# Patient Record
Sex: MALE | Race: WHITE | NOT HISPANIC OR LATINO | URBAN - METROPOLITAN AREA
[De-identification: names, ages, dates, MRNs, and addresses within clinical notes are randomized per-mention and may not be internally consistent; named-entity substitution may affect disease eponyms.]

---

## 2019-07-25 ENCOUNTER — RECORDS - HEALTHEAST (OUTPATIENT)
Dept: LAB | Facility: CLINIC | Age: 84
End: 2019-07-25

## 2019-07-25 LAB
ANION GAP SERPL CALCULATED.3IONS-SCNC: 8 MMOL/L (ref 5–18)
BUN SERPL-MCNC: 16 MG/DL (ref 8–28)
CALCIUM SERPL-MCNC: 9.5 MG/DL (ref 8.5–10.5)
CHLORIDE BLD-SCNC: 107 MMOL/L (ref 98–107)
CO2 SERPL-SCNC: 27 MMOL/L (ref 22–31)
CREAT SERPL-MCNC: 0.96 MG/DL (ref 0.7–1.3)
ERYTHROCYTE [DISTWIDTH] IN BLOOD BY AUTOMATED COUNT: 13.1 % (ref 11–14.5)
GFR SERPL CREATININE-BSD FRML MDRD: >60 ML/MIN/1.73M2
GLUCOSE BLD-MCNC: 115 MG/DL (ref 70–125)
HCT VFR BLD AUTO: 44.8 % (ref 40–54)
HGB BLD-MCNC: 14.1 G/DL (ref 14–18)
MCH RBC QN AUTO: 30.8 PG (ref 27–34)
MCHC RBC AUTO-ENTMCNC: 31.5 G/DL (ref 32–36)
MCV RBC AUTO: 98 FL (ref 80–100)
PLATELET # BLD AUTO: 165 THOU/UL (ref 140–440)
PMV BLD AUTO: 11.8 FL (ref 8.5–12.5)
POTASSIUM BLD-SCNC: 4.1 MMOL/L (ref 3.5–5)
RBC # BLD AUTO: 4.58 MILL/UL (ref 4.4–6.2)
SODIUM SERPL-SCNC: 142 MMOL/L (ref 136–145)
TSH SERPL DL<=0.005 MIU/L-ACNC: 0.94 UIU/ML (ref 0.3–5)
VIT B12 SERPL-MCNC: 290 PG/ML (ref 213–816)
WBC: 6.3 THOU/UL (ref 4–11)

## 2019-10-25 ENCOUNTER — COMMUNICATION - HEALTHEAST (OUTPATIENT)
Dept: NEUROSURGERY | Facility: CLINIC | Age: 84
End: 2019-10-25

## 2019-10-25 DIAGNOSIS — S06.5XAA SDH (SUBDURAL HEMATOMA) (H): ICD-10-CM

## 2019-11-08 ENCOUNTER — HOSPITAL ENCOUNTER (OUTPATIENT)
Dept: CT IMAGING | Facility: HOSPITAL | Age: 84
Discharge: HOME | End: 2019-11-08
Attending: SURGERY

## 2019-11-08 DIAGNOSIS — S06.5XAA SDH (SUBDURAL HEMATOMA) (H): ICD-10-CM

## 2019-11-11 ENCOUNTER — OFFICE VISIT - HEALTHEAST (OUTPATIENT)
Dept: NEUROSURGERY | Facility: CLINIC | Age: 84
End: 2019-11-11

## 2019-11-11 DIAGNOSIS — S06.5XAA SDH (SUBDURAL HEMATOMA) (H): ICD-10-CM

## 2019-11-11 ASSESSMENT — MIFFLIN-ST. JEOR: SCORE: 1489.12

## 2019-11-24 ENCOUNTER — HOSPITAL ENCOUNTER (OUTPATIENT)
Dept: CT IMAGING | Facility: HOSPITAL | Age: 84
Discharge: HOME | End: 2019-11-24

## 2019-11-24 DIAGNOSIS — S06.5XAA SDH (SUBDURAL HEMATOMA) (H): ICD-10-CM

## 2019-11-25 ENCOUNTER — RECORDS - HEALTHEAST (OUTPATIENT)
Dept: ADMINISTRATIVE | Facility: OTHER | Age: 84
End: 2019-11-25

## 2019-11-25 ENCOUNTER — OFFICE VISIT - HEALTHEAST (OUTPATIENT)
Dept: NEUROSURGERY | Facility: CLINIC | Age: 84
End: 2019-11-25

## 2019-11-25 ENCOUNTER — RECORDS - HEALTHEAST (OUTPATIENT)
Dept: LAB | Facility: HOSPITAL | Age: 84
End: 2019-11-25

## 2019-11-25 DIAGNOSIS — S06.5XAA SDH (SUBDURAL HEMATOMA) (H): ICD-10-CM

## 2019-11-25 LAB — LEVETIRACETAM (KEPPRA): 18.1 UG/ML (ref 6–46)

## 2019-11-25 ASSESSMENT — MIFFLIN-ST. JEOR: SCORE: 1468.71

## 2020-01-09 ENCOUNTER — COMMUNICATION - HEALTHEAST (OUTPATIENT)
Dept: NEUROSURGERY | Facility: CLINIC | Age: 85
End: 2020-01-09

## 2021-06-03 VITALS
BODY MASS INDEX: 25.34 KG/M2 | WEIGHT: 177 LBS | HEART RATE: 58 BPM | HEIGHT: 70 IN | SYSTOLIC BLOOD PRESSURE: 141 MMHG | DIASTOLIC BLOOD PRESSURE: 67 MMHG | OXYGEN SATURATION: 98 %

## 2021-06-03 NOTE — PATIENT INSTRUCTIONS - HE
We will bring bill back in one week for a repeat CT. Avoid falling, no aspirin, nsaids, blood thinners.     Call [phone] with the following symptoms:  *Worsening pain not relieved by the  prescription given  *Worsening headache not relieved by the prescription given  *A headache that gets better or worse when you stand up or lie down  *Seizures  *Persistent nausea and vomiting  *Increased sleepiness or difficulty being awakened  *Change in ability to walk, see, think, or talk  *Worsening or new onset of weakness, or numbness and tingling  *Loss or change in your ability to control bowel or bladder function    !!!! IF YOU HAVE A SERIOUS OR THREATENING EMERGENCY, CALL 911 OR COME TO THE EMERGENCY ROOM.  IF YOUR CONDITIONS ALLOWS COME TO THE HOSPITAL WHERE YOUR SURGERY WAS PERFORMED.    QUESTIONS OR CONCERNS:   OUR OFFICE HOURS ARE FROM 9:00 A.M -4:30 P.M. MONDAY-FRIDAY.  AFTER OFFICE HOURS, CALLS ARE ANSWERED BY THE ON-CALL PROVIDER. PLEASE CALL WITH ROUTINE QUESTIONS DURING OFFICE HOURS. THE ON-CALL PROVIDER WILL NOT REFILL PRESCRIPTIONS.      .

## 2021-06-03 NOTE — PATIENT INSTRUCTIONS - HE
PLAN:  1.  CT head in 2 weeks with provider appointment.  2.  Continue Keppra until seen by neurology  3.  Call [phone] with the following symptoms:  *Worsening headache not relieved by the prescription given  *A headache that gets better or worse when you stand up or lie down  *Seizures  *Persistent nausea and vomiting  *Increased sleepiness or difficulty being awakened  *Change in ability to walk, see, think, or talk  *Worsening or new onset of weakness, or numbness and tingling.  4.  *No lifting, pushing or pulling greater than 5-10 pound (this is about a gallon of milk).  *No repetitive bending, twisting, or jarring activities  *No overhead work  *No aerobic or strenuous activity  *No activities with increased risk of falls  *You may move about your home as tolerated  *You may walk up and down stairs as tolerated
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
## 2021-06-04 VITALS
OXYGEN SATURATION: 98 % | HEART RATE: 52 BPM | SYSTOLIC BLOOD PRESSURE: 133 MMHG | HEIGHT: 67 IN | WEIGHT: 183 LBS | BODY MASS INDEX: 28.72 KG/M2 | DIASTOLIC BLOOD PRESSURE: 65 MMHG

## 2021-06-05 NOTE — TELEPHONE ENCOUNTER
Spoke with Hien today and the daughter states they will not be bringing her father here for any further follow up.  She provided our phone number if family changes their mind.   Miranda Conde RN

## 2021-06-05 NOTE — TELEPHONE ENCOUNTER
Zina,  Is this follow up still needed from November. I have not been able to make contact with patient.

## 2021-06-05 NOTE — TELEPHONE ENCOUNTER
Pt is from [facility] in [place]. Contact is Larisa at [phone] and F: [phone].

## 2021-06-05 NOTE — TELEPHONE ENCOUNTER
Spoke with Hien at [facility]  [phone]. She states that she left a message for patients daughter and she has not returned her call to schedule. Hien will attempt to call daughter again.
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
## 2021-06-16 PROBLEM — S06.5XAA SDH (SUBDURAL HEMATOMA) (H): Status: ACTIVE | Noted: 2019-10-21

## 2021-06-16 PROBLEM — R41.3 MEMORY IMPAIRMENT: Status: ACTIVE | Noted: 2019-10-21

## 2021-06-16 PROBLEM — Z86.73 HISTORY OF STROKE: Status: ACTIVE | Noted: 2019-10-21

## 2021-06-20 ENCOUNTER — HEALTH MAINTENANCE LETTER (OUTPATIENT)
Age: 86
End: 2021-06-20

## 2021-10-10 ENCOUNTER — HEALTH MAINTENANCE LETTER (OUTPATIENT)
Age: 86
End: 2021-10-10

## 2022-07-16 ENCOUNTER — HEALTH MAINTENANCE LETTER (OUTPATIENT)
Age: 87
End: 2022-07-16

## 2022-09-18 ENCOUNTER — HEALTH MAINTENANCE LETTER (OUTPATIENT)
Age: 87
End: 2022-09-18

## 2023-07-30 ENCOUNTER — HEALTH MAINTENANCE LETTER (OUTPATIENT)
Age: 88
End: 2023-07-30